# Patient Record
Sex: FEMALE | Race: WHITE | Employment: UNEMPLOYED | URBAN - METROPOLITAN AREA
[De-identification: names, ages, dates, MRNs, and addresses within clinical notes are randomized per-mention and may not be internally consistent; named-entity substitution may affect disease eponyms.]

---

## 2023-05-01 PROCEDURE — 99283 EMERGENCY DEPT VISIT LOW MDM: CPT

## 2023-05-02 ENCOUNTER — HOSPITAL ENCOUNTER (EMERGENCY)
Age: 38
Discharge: HOME | End: 2023-05-02
Attending: EMERGENCY MEDICINE
Payer: COMMERCIAL

## 2023-05-02 VITALS
RESPIRATION RATE: 16 BRPM | OXYGEN SATURATION: 97 % | TEMPERATURE: 98.3 F | HEART RATE: 74 BPM | SYSTOLIC BLOOD PRESSURE: 136 MMHG | DIASTOLIC BLOOD PRESSURE: 91 MMHG

## 2023-05-02 DIAGNOSIS — M79.89 LEFT LEG SWELLING: Primary | ICD-10-CM

## 2023-05-02 PROCEDURE — 6370000000 HC RX 637 (ALT 250 FOR IP): Performed by: EMERGENCY MEDICINE

## 2023-05-02 RX ORDER — HYDROCODONE BITARTRATE AND ACETAMINOPHEN 5; 325 MG/1; MG/1
1 TABLET ORAL EVERY 4 HOURS PRN
Qty: 8 TABLET | Refills: 0 | Status: SHIPPED | OUTPATIENT
Start: 2023-05-02 | End: 2023-05-05

## 2023-05-02 RX ORDER — OXYCODONE HYDROCHLORIDE AND ACETAMINOPHEN 5; 325 MG/1; MG/1
1 TABLET ORAL ONCE
Status: COMPLETED | OUTPATIENT
Start: 2023-05-02 | End: 2023-05-02

## 2023-05-02 RX ADMIN — OXYCODONE HYDROCHLORIDE AND ACETAMINOPHEN 1 TABLET: 5; 325 TABLET ORAL at 01:18

## 2023-05-02 ASSESSMENT — PAIN SCALES - GENERAL
PAINLEVEL_OUTOF10: 10
PAINLEVEL_OUTOF10: 10

## 2023-05-02 ASSESSMENT — LIFESTYLE VARIABLES
HOW MANY STANDARD DRINKS CONTAINING ALCOHOL DO YOU HAVE ON A TYPICAL DAY: PATIENT DOES NOT DRINK
HOW OFTEN DO YOU HAVE A DRINK CONTAINING ALCOHOL: NEVER

## 2023-05-02 ASSESSMENT — PAIN - FUNCTIONAL ASSESSMENT: PAIN_FUNCTIONAL_ASSESSMENT: 0-10

## 2023-05-02 NOTE — ED PROVIDER NOTES
[id number] ED  EMERGENCY DEPARTMENT ENCOUNTER        Pt Name: Yarelis Donnelly  MRN: [id number]  [place] 1985  Date of evaluation: 5/1/2023  Provider: Naman Piper MD  PCP: RENEE Ma CNP  Note Started: 4:04 AM EDT 5/2/23    CHIEF COMPLAINT       Chief Complaint   Patient presents with    Leg Problem     Patient comes to ER with complaints of swelling and pain in left leg. Patient reports she had a fall a week ago, she tripped over a stump and fell and hit her knee on a rock. She was seen at [address] [place] [address] ER for symptoms. Was told she needed US to rule out DVT. Followed up with ortho who wanted her to have MRI. Patient states neither test have been done yet. She also states that the swelling started and has worsened over the last 3 days. Patient has noticeable swelling to left lower extremity       HISTORY OF PRESENT ILLNESS: 1 or more Elements   History From: Patient/family        Yarelis Donnelly is a 41 y.o. female who presents for evaluation of left leg swelling. Patient reports injuring her leg on [address]. Fall from standing injuring the anterior portion of the leg. She had persistent pain and swelling since the initial injury. Numbness or weakness. Patient initially seen at an urgent care and imaging was reportedly negative. Patient was then seen in the emergency department where CT of the knee was obtained which was negative for any acute abnormalities. D-dimer time was also negative and patient was written an order for an outpatient ultrasound study. This was on 4/27/2023. Patient reports that she has bruising of the wrist and crutches but has persistent swelling primarily from the knee down to the foot. Notes bruising to the anterior portion of the foot. She reports indentation to the anterior portion of the leg. He states that most of the pain is primarily in the lateral aspect and anterior aspect of the leg.   She reports that she has